# Patient Record
Sex: FEMALE | Race: WHITE | NOT HISPANIC OR LATINO | Employment: OTHER | URBAN - METROPOLITAN AREA
[De-identification: names, ages, dates, MRNs, and addresses within clinical notes are randomized per-mention and may not be internally consistent; named-entity substitution may affect disease eponyms.]

---

## 2021-02-09 ENCOUNTER — PREP FOR SURGERY (OUTPATIENT)
Dept: OTHER | Facility: HOSPITAL | Age: 66
End: 2021-02-09

## 2021-02-09 ENCOUNTER — OFFICE VISIT (OUTPATIENT)
Dept: SURGERY | Facility: CLINIC | Age: 66
End: 2021-02-09

## 2021-02-09 VITALS
HEART RATE: 82 BPM | BODY MASS INDEX: 43.55 KG/M2 | DIASTOLIC BLOOD PRESSURE: 80 MMHG | TEMPERATURE: 97.1 F | OXYGEN SATURATION: 95 % | HEIGHT: 66 IN | SYSTOLIC BLOOD PRESSURE: 124 MMHG | WEIGHT: 271 LBS

## 2021-02-09 DIAGNOSIS — K21.9 GASTROESOPHAGEAL REFLUX DISEASE, UNSPECIFIED WHETHER ESOPHAGITIS PRESENT: Primary | ICD-10-CM

## 2021-02-09 DIAGNOSIS — K21.9 GERD WITHOUT ESOPHAGITIS: Primary | ICD-10-CM

## 2021-02-09 PROBLEM — J44.9 COPD (CHRONIC OBSTRUCTIVE PULMONARY DISEASE): Status: ACTIVE | Noted: 2020-10-29

## 2021-02-09 PROBLEM — K90.9 MALABSORPTION OF IRON: Status: ACTIVE | Noted: 2017-12-19

## 2021-02-09 PROBLEM — M79.7 FIBROMYALGIA: Status: ACTIVE | Noted: 2018-09-04

## 2021-02-09 PROCEDURE — 99203 OFFICE O/P NEW LOW 30 MIN: CPT | Performed by: SURGERY

## 2021-02-09 RX ORDER — SODIUM CHLORIDE 9 MG/ML
100 INJECTION, SOLUTION INTRAVENOUS CONTINUOUS
Status: CANCELLED | OUTPATIENT
Start: 2021-02-09

## 2021-02-09 RX ORDER — OMEPRAZOLE 40 MG/1
40 CAPSULE, DELAYED RELEASE ORAL NIGHTLY
COMMUNITY
Start: 2020-11-11

## 2021-02-09 RX ORDER — ALENDRONATE SODIUM 70 MG/1
70 TABLET ORAL
COMMUNITY

## 2021-02-09 RX ORDER — OLOPATADINE HYDROCHLORIDE 1 MG/ML
1 SOLUTION/ DROPS OPHTHALMIC 2 TIMES DAILY
COMMUNITY
Start: 2020-05-20 | End: 2021-05-20

## 2021-02-09 RX ORDER — KETOCONAZOLE 20 MG/G
1 CREAM TOPICAL DAILY
COMMUNITY
Start: 2020-11-18

## 2021-02-09 RX ORDER — BUPROPION HYDROCHLORIDE 150 MG/1
150 TABLET ORAL EVERY MORNING
COMMUNITY
Start: 2020-12-14

## 2021-02-09 RX ORDER — ESCITALOPRAM OXALATE 20 MG/1
20 TABLET ORAL DAILY
COMMUNITY

## 2021-02-09 RX ORDER — LEVOCETIRIZINE DIHYDROCHLORIDE 5 MG/1
5 TABLET, FILM COATED ORAL DAILY
COMMUNITY
Start: 2020-12-08

## 2021-02-09 RX ORDER — LEVOTHYROXINE SODIUM 0.2 MG/1
200 TABLET ORAL DAILY
COMMUNITY
Start: 2020-10-22

## 2021-02-09 RX ORDER — ATORVASTATIN CALCIUM 20 MG/1
20 TABLET, FILM COATED ORAL NIGHTLY
COMMUNITY
Start: 2020-12-08

## 2021-02-09 RX ORDER — TIOTROPIUM BROMIDE AND OLODATEROL 3.124; 2.736 UG/1; UG/1
2 SPRAY, METERED RESPIRATORY (INHALATION) DAILY
COMMUNITY
Start: 2020-12-01

## 2021-02-09 RX ORDER — GABAPENTIN 600 MG/1
600 TABLET ORAL 3 TIMES DAILY
COMMUNITY
Start: 2021-02-05

## 2021-02-09 RX ORDER — SOLIFENACIN SUCCINATE 10 MG/1
10 TABLET, FILM COATED ORAL DAILY
COMMUNITY
Start: 2020-12-08

## 2021-02-09 RX ORDER — ARIPIPRAZOLE 5 MG/1
5 TABLET ORAL DAILY
COMMUNITY
Start: 2020-12-08 | End: 2022-01-26

## 2021-02-09 NOTE — PROGRESS NOTES
Subjective   Luz Bruce is a 65 y.o. female.     History of present illness  Ms. Bruce is a pleasant 65-year-old female seen in the office today at her own request from the GERD help hotline for 20+ year history of reflux.  Is been on a number of the different medications for that and currently is on 40 mg of omeprazole daily.  She states that the medicine is not controlling her symptoms.  She still awakens at night with burning and the sensation of fluid and food coming up into her esophagus.  She has no weight loss without trying associated with this.  She has no fevers chills she does have nausea but no vomiting.  She states that the back of her throat hurts and she knows it is from the reflux.  She has never had upper GI evaluation either by x-rays or endoscopically.  She has had colonoscopy.  I have suggested that she undergo an EGD with dilatation to get more information.    No past medical history on file.    Past Surgical History:   Procedure Laterality Date   • BUNIONECTOMY     • FOOT SURGERY      5 nodes removed   • HYSTERECTOMY     • NEPHRECTOMY RADICAL Left 2016   • PLANTAR FASCIECTOMY         Outpatient Encounter Medications as of 2/9/2021   Medication Sig Dispense Refill   • alendronate (FOSAMAX) 70 MG tablet Take 70 mg by mouth Every 7 (Seven) Days.     • ARIPiprazole (ABILIFY) 5 MG tablet Take 5 mg by mouth Daily.     • atorvastatin (LIPITOR) 20 MG tablet      • buPROPion XL (WELLBUTRIN XL) 150 MG 24 hr tablet Take 150 mg by mouth Every Morning.     • escitalopram (LEXAPRO) 20 MG tablet Take 20 mg by mouth Daily.     • gabapentin (NEURONTIN) 600 MG tablet Take 600 mg by mouth 3 (Three) Times a Day.     • ketoconazole (NIZORAL) 2 % cream APPLY CREAM TOPICALLY TO AFFECTED AREA TWICE DAILY FOR 30 DAYS     • levocetirizine (XYZAL) 5 MG tablet      • levothyroxine (SYNTHROID, LEVOTHROID) 200 MCG tablet Take 200 mcg by mouth Daily.     • olopatadine (PATANOL) 0.1 % ophthalmic solution Inject 1 drop  into the eye.     • omeprazole (priLOSEC) 40 MG capsule Take 40 mg by mouth Daily.     • solifenacin (VESICARE) 10 MG tablet Take 10 mg by mouth Daily.     • Stiolto Respimat 2.5-2.5 MCG/ACT aerosol solution inhaler Inhale 2 puffs Daily.       No facility-administered encounter medications on file as of 2021.        Allergies   Allergen Reactions   • Amoxicillin Rash       Family History   Problem Relation Age of Onset   • Lung cancer Mother    • Hyperlipidemia Mother    • Cervical cancer Sister    • Lung cancer Sister        Social History     Socioeconomic History   • Marital status:      Spouse name: Not on file   • Number of children: Not on file   • Years of education: Not on file   • Highest education level: Not on file   Tobacco Use   • Smoking status: Former Smoker     Types: Cigarettes     Quit date: 2007     Years since quittin.0   • Smokeless tobacco: Never Used       The following portions of the patient's history were reviewed and updated as appropriate: allergies, current medications, past family history, past medical history, past social history, past surgical history and problem list.    Objective     Complete review of systems is done and unremarkable with exception the chief complaint.    Physical exam shows a pleasant obese 65-year-old female.  HEENT is negative.  Heart regular.  Lungs are clear.  Abdomen is obese nontender no palpable mass.  Extremities with equal range of motion in the upper and lower extremities.  She has symmetrical strength in usage.  Neuro shows no obvious focal deficit.    Impression: Long history of GE reflux disease    Recommendation: EGD with dilatation.  Need to rule out Landon's hiatal hernia etc.  Assessment/Plan   There are no diagnoses linked to this encounter.               Mirza Dash DO  2021  08:37 EST

## 2021-02-09 NOTE — H&P
Subjective   Luz Bruce is a 65 y.o. female.     History of present illness  Ms. Bruce is a pleasant 65-year-old female seen in the office today at her own request from the GERD help hotline for 20+ year history of reflux.  Is been on a number of the different medications for that and currently is on 40 mg of omeprazole daily.  She states that the medicine is not controlling her symptoms.  She still awakens at night with burning and the sensation of fluid and food coming up into her esophagus.  She has no weight loss without trying associated with this.  She has no fevers chills she does have nausea but no vomiting.  She states that the back of her throat hurts and she knows it is from the reflux.  She has never had upper GI evaluation either by x-rays or endoscopically.  She has had colonoscopy.  I have suggested that she undergo an EGD with dilatation to get more information.    No past medical history on file.    Past Surgical History:   Procedure Laterality Date   • BUNIONECTOMY     • FOOT SURGERY      5 nodes removed   • HYSTERECTOMY     • NEPHRECTOMY RADICAL Left 2016   • PLANTAR FASCIECTOMY         Outpatient Encounter Medications as of 2/9/2021   Medication Sig Dispense Refill   • alendronate (FOSAMAX) 70 MG tablet Take 70 mg by mouth Every 7 (Seven) Days.     • ARIPiprazole (ABILIFY) 5 MG tablet Take 5 mg by mouth Daily.     • atorvastatin (LIPITOR) 20 MG tablet      • buPROPion XL (WELLBUTRIN XL) 150 MG 24 hr tablet Take 150 mg by mouth Every Morning.     • escitalopram (LEXAPRO) 20 MG tablet Take 20 mg by mouth Daily.     • gabapentin (NEURONTIN) 600 MG tablet Take 600 mg by mouth 3 (Three) Times a Day.     • ketoconazole (NIZORAL) 2 % cream APPLY CREAM TOPICALLY TO AFFECTED AREA TWICE DAILY FOR 30 DAYS     • levocetirizine (XYZAL) 5 MG tablet      • levothyroxine (SYNTHROID, LEVOTHROID) 200 MCG tablet Take 200 mcg by mouth Daily.     • olopatadine (PATANOL) 0.1 % ophthalmic solution Inject 1 drop  into the eye.     • omeprazole (priLOSEC) 40 MG capsule Take 40 mg by mouth Daily.     • solifenacin (VESICARE) 10 MG tablet Take 10 mg by mouth Daily.     • Stiolto Respimat 2.5-2.5 MCG/ACT aerosol solution inhaler Inhale 2 puffs Daily.       No facility-administered encounter medications on file as of 2021.        Allergies   Allergen Reactions   • Amoxicillin Rash       Family History   Problem Relation Age of Onset   • Lung cancer Mother    • Hyperlipidemia Mother    • Cervical cancer Sister    • Lung cancer Sister        Social History     Socioeconomic History   • Marital status:      Spouse name: Not on file   • Number of children: Not on file   • Years of education: Not on file   • Highest education level: Not on file   Tobacco Use   • Smoking status: Former Smoker     Types: Cigarettes     Quit date: 2007     Years since quittin.0   • Smokeless tobacco: Never Used       The following portions of the patient's history were reviewed and updated as appropriate: allergies, current medications, past family history, past medical history, past social history, past surgical history and problem list.    Objective     Complete review of systems is done and unremarkable with exception the chief complaint.    Physical exam shows a pleasant obese 65-year-old female.  HEENT is negative.  Heart regular.  Lungs are clear.  Abdomen is obese nontender no palpable mass.  Extremities with equal range of motion in the upper and lower extremities.  She has symmetrical strength in usage.  Neuro shows no obvious focal deficit.    Impression: Long history of GE reflux disease    Recommendation: EGD with dilatation.  Need to rule out Landon's hiatal hernia etc.  Assessment/Plan   There are no diagnoses linked to this encounter.               Mirza Dash,   2021  08:40 EST

## 2021-03-19 ENCOUNTER — LAB (OUTPATIENT)
Dept: LAB | Facility: HOSPITAL | Age: 66
End: 2021-03-19

## 2021-03-19 DIAGNOSIS — K21.9 GERD WITHOUT ESOPHAGITIS: ICD-10-CM

## 2021-03-19 LAB
ALBUMIN SERPL-MCNC: 4.6 G/DL (ref 3.5–5.2)
ALBUMIN/GLOB SERPL: 1.8 G/DL
ALP SERPL-CCNC: 80 U/L (ref 39–117)
ALT SERPL W P-5'-P-CCNC: 22 U/L (ref 1–33)
ANION GAP SERPL CALCULATED.3IONS-SCNC: 11.8 MMOL/L (ref 5–15)
AST SERPL-CCNC: 16 U/L (ref 1–32)
BASOPHILS # BLD AUTO: 0.05 10*3/MM3 (ref 0–0.2)
BASOPHILS NFR BLD AUTO: 0.7 % (ref 0–1.5)
BILIRUB SERPL-MCNC: 0.8 MG/DL (ref 0–1.2)
BUN SERPL-MCNC: 12 MG/DL (ref 8–23)
BUN/CREAT SERPL: 11.3 (ref 7–25)
CALCIUM SPEC-SCNC: 9.3 MG/DL (ref 8.6–10.5)
CHLORIDE SERPL-SCNC: 105 MMOL/L (ref 98–107)
CO2 SERPL-SCNC: 27.2 MMOL/L (ref 22–29)
CREAT SERPL-MCNC: 1.06 MG/DL (ref 0.57–1)
DEPRECATED RDW RBC AUTO: 43.7 FL (ref 37–54)
EOSINOPHIL # BLD AUTO: 0.18 10*3/MM3 (ref 0–0.4)
EOSINOPHIL NFR BLD AUTO: 2.4 % (ref 0.3–6.2)
ERYTHROCYTE [DISTWIDTH] IN BLOOD BY AUTOMATED COUNT: 15.4 % (ref 12.3–15.4)
GFR SERPL CREATININE-BSD FRML MDRD: 52 ML/MIN/1.73
GLOBULIN UR ELPH-MCNC: 2.6 GM/DL
GLUCOSE SERPL-MCNC: 134 MG/DL (ref 65–99)
HCT VFR BLD AUTO: 41.3 % (ref 34–46.6)
HGB BLD-MCNC: 12.8 G/DL (ref 12–15.9)
IMM GRANULOCYTES # BLD AUTO: 0.05 10*3/MM3 (ref 0–0.05)
IMM GRANULOCYTES NFR BLD AUTO: 0.7 % (ref 0–0.5)
LYMPHOCYTES # BLD AUTO: 1.52 10*3/MM3 (ref 0.7–3.1)
LYMPHOCYTES NFR BLD AUTO: 20.4 % (ref 19.6–45.3)
MCH RBC QN AUTO: 24.5 PG (ref 26.6–33)
MCHC RBC AUTO-ENTMCNC: 31 G/DL (ref 31.5–35.7)
MCV RBC AUTO: 79.1 FL (ref 79–97)
MONOCYTES # BLD AUTO: 0.49 10*3/MM3 (ref 0.1–0.9)
MONOCYTES NFR BLD AUTO: 6.6 % (ref 5–12)
NEUTROPHILS NFR BLD AUTO: 5.16 10*3/MM3 (ref 1.7–7)
NEUTROPHILS NFR BLD AUTO: 69.2 % (ref 42.7–76)
NRBC BLD AUTO-RTO: 0 /100 WBC (ref 0–0.2)
PLATELET # BLD AUTO: 246 10*3/MM3 (ref 140–450)
PMV BLD AUTO: 10.1 FL (ref 6–12)
POTASSIUM SERPL-SCNC: 4.9 MMOL/L (ref 3.5–5.2)
PROT SERPL-MCNC: 7.2 G/DL (ref 6–8.5)
RBC # BLD AUTO: 5.22 10*6/MM3 (ref 3.77–5.28)
SARS-COV-2 ORF1AB RESP QL NAA+PROBE: NOT DETECTED
SODIUM SERPL-SCNC: 144 MMOL/L (ref 136–145)
WBC # BLD AUTO: 7.45 10*3/MM3 (ref 3.4–10.8)

## 2021-03-19 PROCEDURE — 36415 COLL VENOUS BLD VENIPUNCTURE: CPT

## 2021-03-19 PROCEDURE — 80053 COMPREHEN METABOLIC PANEL: CPT

## 2021-03-19 PROCEDURE — C9803 HOPD COVID-19 SPEC COLLECT: HCPCS

## 2021-03-19 PROCEDURE — 85025 COMPLETE CBC W/AUTO DIFF WBC: CPT

## 2021-03-19 PROCEDURE — U0004 COV-19 TEST NON-CDC HGH THRU: HCPCS

## 2021-03-22 ENCOUNTER — ANESTHESIA (OUTPATIENT)
Dept: GASTROENTEROLOGY | Facility: HOSPITAL | Age: 66
End: 2021-03-22

## 2021-03-22 ENCOUNTER — ANESTHESIA EVENT (OUTPATIENT)
Dept: GASTROENTEROLOGY | Facility: HOSPITAL | Age: 66
End: 2021-03-22

## 2021-03-22 ENCOUNTER — BULK ORDERING (OUTPATIENT)
Dept: CASE MANAGEMENT | Facility: OTHER | Age: 66
End: 2021-03-22

## 2021-03-22 ENCOUNTER — HOSPITAL ENCOUNTER (OUTPATIENT)
Facility: HOSPITAL | Age: 66
Setting detail: HOSPITAL OUTPATIENT SURGERY
Discharge: HOME | End: 2021-03-22
Attending: SURGERY | Admitting: SURGERY

## 2021-03-22 VITALS
BODY MASS INDEX: 45.18 KG/M2 | RESPIRATION RATE: 21 BRPM | WEIGHT: 271.17 LBS | SYSTOLIC BLOOD PRESSURE: 134 MMHG | HEIGHT: 65 IN | HEART RATE: 81 BPM | TEMPERATURE: 98.1 F | OXYGEN SATURATION: 92 % | DIASTOLIC BLOOD PRESSURE: 71 MMHG

## 2021-03-22 VITALS — DIASTOLIC BLOOD PRESSURE: 85 MMHG | OXYGEN SATURATION: 99 % | SYSTOLIC BLOOD PRESSURE: 114 MMHG | HEART RATE: 84 BPM

## 2021-03-22 DIAGNOSIS — K21.9 GERD WITHOUT ESOPHAGITIS: ICD-10-CM

## 2021-03-22 DIAGNOSIS — Z23 IMMUNIZATION DUE: ICD-10-CM

## 2021-03-22 PROCEDURE — 43450 DILATE ESOPHAGUS 1/MULT PASS: CPT | Performed by: SURGERY

## 2021-03-22 PROCEDURE — 88305 TISSUE EXAM BY PATHOLOGIST: CPT | Performed by: SURGERY

## 2021-03-22 PROCEDURE — 25010000002 FENTANYL CITRATE (PF) 100 MCG/2ML SOLUTION: Performed by: NURSE ANESTHETIST, CERTIFIED REGISTERED

## 2021-03-22 PROCEDURE — 43239 EGD BIOPSY SINGLE/MULTIPLE: CPT | Performed by: SURGERY

## 2021-03-22 PROCEDURE — 88342 IMHCHEM/IMCYTCHM 1ST ANTB: CPT | Performed by: SURGERY

## 2021-03-22 PROCEDURE — 25010000002 PROPOFOL 10 MG/ML EMULSION: Performed by: NURSE ANESTHETIST, CERTIFIED REGISTERED

## 2021-03-22 RX ORDER — LIDOCAINE HYDROCHLORIDE 10 MG/ML
INJECTION, SOLUTION EPIDURAL; INFILTRATION; INTRACAUDAL; PERINEURAL AS NEEDED
Status: DISCONTINUED | OUTPATIENT
Start: 2021-03-22 | End: 2021-03-22 | Stop reason: SURG

## 2021-03-22 RX ORDER — ACETAMINOPHEN 650 MG/1
650 SUPPOSITORY RECTAL ONCE AS NEEDED
Status: DISCONTINUED | OUTPATIENT
Start: 2021-03-22 | End: 2021-03-22 | Stop reason: HOSPADM

## 2021-03-22 RX ORDER — ACETAMINOPHEN 325 MG/1
650 TABLET ORAL ONCE AS NEEDED
Status: DISCONTINUED | OUTPATIENT
Start: 2021-03-22 | End: 2021-03-22 | Stop reason: HOSPADM

## 2021-03-22 RX ORDER — ONDANSETRON 2 MG/ML
4 INJECTION INTRAMUSCULAR; INTRAVENOUS ONCE AS NEEDED
Status: DISCONTINUED | OUTPATIENT
Start: 2021-03-22 | End: 2021-03-22 | Stop reason: HOSPADM

## 2021-03-22 RX ORDER — SODIUM CHLORIDE 9 MG/ML
100 INJECTION, SOLUTION INTRAVENOUS CONTINUOUS
Status: DISCONTINUED | OUTPATIENT
Start: 2021-03-22 | End: 2021-03-22 | Stop reason: HOSPADM

## 2021-03-22 RX ORDER — FENTANYL CITRATE 50 UG/ML
INJECTION, SOLUTION INTRAMUSCULAR; INTRAVENOUS AS NEEDED
Status: DISCONTINUED | OUTPATIENT
Start: 2021-03-22 | End: 2021-03-22 | Stop reason: SURG

## 2021-03-22 RX ORDER — SODIUM CHLORIDE, SODIUM LACTATE, POTASSIUM CHLORIDE, CALCIUM CHLORIDE 600; 310; 30; 20 MG/100ML; MG/100ML; MG/100ML; MG/100ML
INJECTION, SOLUTION INTRAVENOUS CONTINUOUS PRN
Status: DISCONTINUED | OUTPATIENT
Start: 2021-03-22 | End: 2021-03-22

## 2021-03-22 RX ORDER — SODIUM CHLORIDE, SODIUM LACTATE, POTASSIUM CHLORIDE, CALCIUM CHLORIDE 600; 310; 30; 20 MG/100ML; MG/100ML; MG/100ML; MG/100ML
INJECTION, SOLUTION INTRAVENOUS CONTINUOUS PRN
Status: DISCONTINUED | OUTPATIENT
Start: 2021-03-22 | End: 2021-03-22 | Stop reason: SURG

## 2021-03-22 RX ADMIN — FENTANYL CITRATE 50 MCG: 50 INJECTION, SOLUTION INTRAMUSCULAR; INTRAVENOUS at 07:35

## 2021-03-22 RX ADMIN — PROPOFOL 50 MCG/KG/MIN: 10 INJECTION, EMULSION INTRAVENOUS at 07:37

## 2021-03-22 RX ADMIN — SODIUM CHLORIDE 100 ML/HR: 9 INJECTION, SOLUTION INTRAVENOUS at 06:49

## 2021-03-22 RX ADMIN — LIDOCAINE HYDROCHLORIDE 50 MG: 10 INJECTION, SOLUTION EPIDURAL; INFILTRATION; INTRACAUDAL; PERINEURAL at 07:36

## 2021-03-22 RX ADMIN — SODIUM CHLORIDE, SODIUM LACTATE, POTASSIUM CHLORIDE, AND CALCIUM CHLORIDE: .6; .31; .03; .02 INJECTION, SOLUTION INTRAVENOUS at 07:29

## 2021-03-22 NOTE — ANESTHESIA PREPROCEDURE EVALUATION
Anesthesia Evaluation     Patient summary reviewed and Nursing notes reviewed   no history of anesthetic complications:  NPO Solid Status: > 8 hours  NPO Liquid Status: > 8 hours           Airway   Mallampati: II  TM distance: >3 FB  Neck ROM: full  No difficulty expected  Dental - normal exam     Pulmonary - normal exam   (+) a smoker Former, COPD,   Cardiovascular - normal exam    (+) hyperlipidemia,       Neuro/Psych  (+) psychiatric history Depression,     GI/Hepatic/Renal/Endo    (+) morbid obesity, GERD, PUD,  renal disease, thyroid problem hypothyroidism    Musculoskeletal (-) negative ROS    Abdominal   (+) obese,    Substance History - negative use     OB/GYN negative ob/gyn ROS         Other      history of cancer                    Anesthesia Plan    ASA 3     MAC     intravenous induction     Anesthetic plan, all risks, benefits, and alternatives have been provided, discussed and informed consent has been obtained with: patient.    Plan discussed with CRNA.

## 2021-03-22 NOTE — H&P
Subjective   Luz Bruce is a 65 y.o. female.     History of present illness  Luz is a 65-year-old seen in the office at her own request from the GERD help hotline.  She has a 20+ year history of GE reflux disease.  She has been on a number of different medications for that currently is on 40 mg of omeprazole daily.  She states medicine is not controlling her symptoms.  She still awakens at night with burning and the sensation of fluid and food coming into her esophagus.  She has no fevers or chills she does have nausea but no vomiting.  States that the back of her throat hurts and she knows it is from the reflux.  She has never had upper GI eval or endoscopy.  She has had a colonoscopy have suggested she undergo an EGD with dilatation to get more information about potential surgery options.  For that reason she presents.    Past Medical History:   Diagnosis Date   • Allergies    • Anxiety and depression    • Blurred vision    • Cancer (CMS/HCC)     left kidney   • COPD (chronic obstructive pulmonary disease) (CMS/HCC)     mild   • GERD (gastroesophageal reflux disease)    • Hyperlipidemia    • Hypothyroid    • Neuropathy    • OAB (overactive bladder)    • Osteoporosis    • Rash     face       Past Surgical History:   Procedure Laterality Date   • BUNIONECTOMY     • FOOT SURGERY      5 nodes removed   • HYSTERECTOMY     • NEPHRECTOMY RADICAL Left 2016    cancer   • PLANTAR FASCIECTOMY         [unfilled]    Allergies   Allergen Reactions   • Amoxicillin Rash       Family History   Problem Relation Age of Onset   • Lung cancer Mother    • Hyperlipidemia Mother    • Cervical cancer Sister    • Lung cancer Sister        Social History     Socioeconomic History   • Marital status:      Spouse name: Not on file   • Number of children: Not on file   • Years of education: Not on file   • Highest education level: Not on file   Tobacco Use   • Smoking status: Former Smoker     Types: Cigarettes     Quit date:  2007     Years since quittin.1   • Smokeless tobacco: Never Used   Substance and Sexual Activity   • Alcohol use: Never   • Drug use: Never   • Sexual activity: Defer       The following portions of the patient's history were reviewed and updated as appropriate: allergies, current medications, past family history, past medical history, past social history, past surgical history and problem list.    Objective     Complete review of systems is done and unremarkable with exception of the chief complaint    Physical exam shows pleasant obese 65-year-old female.  HEENT is negative.  Heart regular.  Lungs are clear.  Abdomen obese nontender no palpable mass.  Extremities with equal range of motion and usage.  Neuro shows no obvious focal deficit.    Impression: Long history of GE reflux disease    Recommendation: EGD with dilatation  Assessment/Plan                  Mirza Dash DO  3/22/2021  07:13 EDT

## 2021-03-22 NOTE — ANESTHESIA POSTPROCEDURE EVALUATION
Patient: Luz Bruce    Procedure Summary     Date: 03/22/21 Room / Location: [facility] ENDOSCOPY 1 / [facility] ENDOSCOPY    Anesthesia Start: 0729 Anesthesia Stop: 0749    Procedure: ESOPHAGOGASTRODUODENOSCOPY with dilatation, bougie 48, 50, 52, 54, 56, 58, AND BIOPSY (N/A ) Diagnosis:       GERD without esophagitis      (GERD without esophagitis [K21.9])    Surgeons: Mirza Dash DO Provider: Derrick Mattson MD    Anesthesia Type: MAC ASA Status: 3          Anesthesia Type: MAC    Vitals  Vitals Value Taken Time   /71 03/22/21 0807   Temp     Pulse 78 03/22/21 0809   Resp     SpO2 92 % 03/22/21 0809   Vitals shown include unvalidated device data.        Post Anesthesia Care and Evaluation    Patient location during evaluation: PACU  Patient participation: complete - patient participated  Level of consciousness: awake  Pain scale: See nurse's notes for pain score.  Pain management: adequate  Airway patency: patent  Anesthetic complications: No anesthetic complications  PONV Status: none  Cardiovascular status: acceptable  Respiratory status: acceptable  Hydration status: acceptable    Comments: Patient seen and examined postoperatively; vital signs stable; SpO2 greater than or equal to 90%; cardiopulmonary status stable; nausea/vomiting adequately controlled; pain adequately controlled; no apparent anesthesia complications; patient discharged from anesthesia care when discharge criteria were met

## 2021-03-22 NOTE — OP NOTE
ESOPHAGOGASTRODUODENOSCOPY  Procedure Report    Patient Name:  Luz Bruce  YOB: 1955    Date of Surgery:  3/22/2021     Indications: GERD    Pre-op Diagnosis:   GERD without esophagitis [K21.9]       Post-Op Diagnosis Codes:     * GERD without esophagitis [K21.9], antral gastritis,  Procedure/CPT® Codes:      Procedure(s):  ESOPHAGOGASTRODUODENOSCOPY with dilatation, bougie 48, 50, 52, 54, 56, 58    Staff:  Surgeon(s):  Mirza Dash DO         Anesthesia: Monitored Anesthesia Care    Anesthetist: Dr. Inman    Estimated Blood Loss: none    Implants:    Nothing was implanted during the procedure    Specimen:          Specimens     ID Source Type Tests Collected By Collected At Frozen?    A Gastric, Antrum Tissue · TISSUE PATHOLOGY EXAM   Mirza Dash DO 3/22/21 0734 No    Description: antrum Biopsy    This specimen was not marked as sent.              Findings: Acute antral gastritis, no evidence of hiatal hernia, no Landon's    Complications: None    Description of Procedure: Ms. Bruce is a 65-year-old female who seen for severe reflux disease for 20+ years.  She has been on a number of different proton pump inhibitors and currently takes omeprazole 40 mg daily.  She cannot lie flat without regurgitation.  She has never undergone an upper endoscopic evaluation.  We recommended that she undergo an EGD with dilatation to see if she is a candidate for transoral fundoplication.  We discussed the procedure and risks in detail.    Patient was taken the operating room placed in the supine position.  IV sedation was done by nurse anesthesia and by Dr. Francois.  Timeout done identity verified.  The Olympus upper pan video scope was passed by the cricopharyngeus into the esophagus stomach and duodenum.  Duodenum was normal.  Scope was withdrawn back into the stomach stomach is inspected she has multiple inflammatory polyps consistent with long-term PPI use.  The antrum shows focal areas of  gastritis and biopsies taken there to check for helical back to her.  Scope was retroflexed upon itself viewing the EG junction.  There is no evidence of significant hiatal hernia.  Scope was then withdrawn back to the distal esophagus and the Z-line noted.  It is irregular consistent with reflux but no Landon's.  The remainder the esophageal mucosa is normal on the way out.  Z-line is located at 39 cm from the incisors.  We then sequentially dilated her esophagus from 48-58 Armenian using Taylor type dilators.  She tolerated the procedure well and was transferred back to the recovery area in satisfactory condition.        Mirza Dash,      Date: 3/22/2021  Time: 07:41 EDT

## 2021-03-22 NOTE — DISCHARGE INSTRUCTIONS
A responsible adult should stay with you and you should rest quietly for the rest of the day.    Do not drink alcohol, drive, operate any heavy machinery or power tools or make any legal/important decisions for the next 24 hours.     Progress your diet as tolerated.  If you begin to experience severe pain, increased shortness of breath, racing heartbeat or a fever above 101 F, seek immediate medical attention.     Follow up with MD as instructed. Call office for results in 3 to 5 days if needed.    DR STEPHENSON [phone]    Findings: Acute antral gastritis, no evidence of hiatal hernia, no Landon's

## 2021-03-23 LAB
LAB AP CASE REPORT: NORMAL
PATH REPORT.FINAL DX SPEC: NORMAL
PATH REPORT.GROSS SPEC: NORMAL

## 2021-04-12 ENCOUNTER — PREP FOR SURGERY (OUTPATIENT)
Dept: OTHER | Facility: HOSPITAL | Age: 66
End: 2021-04-12

## 2021-04-12 ENCOUNTER — OFFICE VISIT (OUTPATIENT)
Dept: SURGERY | Facility: CLINIC | Age: 66
End: 2021-04-12

## 2021-04-12 VITALS
WEIGHT: 270 LBS | SYSTOLIC BLOOD PRESSURE: 169 MMHG | DIASTOLIC BLOOD PRESSURE: 100 MMHG | HEIGHT: 65 IN | BODY MASS INDEX: 44.98 KG/M2 | OXYGEN SATURATION: 95 % | HEART RATE: 86 BPM | TEMPERATURE: 96.9 F

## 2021-04-12 DIAGNOSIS — K21.9 GERD WITHOUT ESOPHAGITIS: Primary | ICD-10-CM

## 2021-04-12 DIAGNOSIS — K21.9 GASTROESOPHAGEAL REFLUX DISEASE WITHOUT ESOPHAGITIS: Primary | ICD-10-CM

## 2021-04-12 PROCEDURE — 99213 OFFICE O/P EST LOW 20 MIN: CPT | Performed by: SURGERY

## 2021-04-12 RX ORDER — CLINDAMYCIN PHOSPHATE 900 MG/50ML
900 INJECTION, SOLUTION INTRAVENOUS ONCE
Status: CANCELLED | OUTPATIENT
Start: 2021-04-12 | End: 2021-04-12

## 2021-04-12 RX ORDER — SODIUM CHLORIDE 9 MG/ML
100 INJECTION, SOLUTION INTRAVENOUS CONTINUOUS
Status: CANCELLED | OUTPATIENT
Start: 2021-04-12

## 2021-04-12 NOTE — PROGRESS NOTES
Subjective   Luz Bruce is a 65 y.o. female.     History of present illness  Teo is seen in the office today to discuss the results of her EGD with biopsy recently.  She was found to have some antral gastritis but no helical factor.  She had no significant hiatal hernia but does reflux.  Consequently she would be a good candidate for straight transoral fundoplication.    In the office today we have discussed that and answered all her questions.  We will get her scheduled for straight TIF.    Past Medical History:   Diagnosis Date   • Allergies    • Anxiety and depression    • Blurred vision    • Cancer (CMS/HCC)     left kidney   • COPD (chronic obstructive pulmonary disease) (CMS/HCC)     mild   • GERD (gastroesophageal reflux disease)    • Hyperlipidemia    • Hypothyroid    • Neuropathy    • OAB (overactive bladder)    • Osteoporosis    • Rash     face       Past Surgical History:   Procedure Laterality Date   • BUNIONECTOMY     • ENDOSCOPY N/A 3/22/2021    Procedure: ESOPHAGOGASTRODUODENOSCOPY with dilatation, bougie 48, 50, 52, 54, 56, 58, AND BIOPSY;  Surgeon: Mirza Dash DO;  Location: [facility] ENDOSCOPY;  Service: General;  Laterality: N/A;  gastritis, GERD, dysphagia   • FOOT SURGERY      5 nodes removed   • HYSTERECTOMY     • NEPHRECTOMY RADICAL Left 2016    cancer   • PLANTAR FASCIECTOMY         Outpatient Encounter Medications as of 4/12/2021   Medication Sig Dispense Refill   • alendronate (FOSAMAX) 70 MG tablet Take 70 mg by mouth Every 7 (Seven) Days. sunday     • ARIPiprazole (ABILIFY) 5 MG tablet Take 5 mg by mouth Daily. Take preop     • atorvastatin (LIPITOR) 20 MG tablet Take 20 mg by mouth Every Night.     • buPROPion XL (WELLBUTRIN XL) 150 MG 24 hr tablet Take 150 mg by mouth Every Morning. Take preop     • escitalopram (LEXAPRO) 20 MG tablet Take 20 mg by mouth Daily. Take preop     • gabapentin (NEURONTIN) 600 MG tablet Take 600 mg by mouth 3 (Three) Times a Day. Take preop     •  ketoconazole (NIZORAL) 2 % cream Apply 1 application topically to the appropriate area as directed Daily. Use post procedure     • levocetirizine (XYZAL) 5 MG tablet Take 5 mg by mouth Daily. May take preop     • levothyroxine (SYNTHROID, LEVOTHROID) 200 MCG tablet Take 200 mcg by mouth Daily. Take preop     • olopatadine (PATANOL) 0.1 % ophthalmic solution Administer 1 drop to both eyes 2 (Two) Times a Day. Use preop     • omeprazole (priLOSEC) 40 MG capsule Take 40 mg by mouth Every Night.     • solifenacin (VESICARE) 10 MG tablet Take 10 mg by mouth Daily. Take preop     • Stiolto Respimat 2.5-2.5 MCG/ACT aerosol solution inhaler Inhale 2 puffs Daily. Take preop       No facility-administered encounter medications on file as of 2021.       Allergies   Allergen Reactions   • Amoxicillin Rash       Family History   Problem Relation Age of Onset   • Lung cancer Mother    • Hyperlipidemia Mother    • Cervical cancer Sister    • Lung cancer Sister        Social History     Socioeconomic History   • Marital status:      Spouse name: Not on file   • Number of children: Not on file   • Years of education: Not on file   • Highest education level: Not on file   Tobacco Use   • Smoking status: Former Smoker     Types: Cigarettes     Quit date: 2007     Years since quittin.1   • Smokeless tobacco: Never Used   Substance and Sexual Activity   • Alcohol use: Never   • Drug use: Never   • Sexual activity: Defer       The following portions of the patient's history were reviewed and updated as appropriate: allergies, current medications, past family history, past medical history, past social history, past surgical history and problem list.    Objective       Assessment/Plan   There are no diagnoses linked to this encounter.    Impression: Transoral fundoplication, no hiatal hernia component    Plan: We will have Erin give her a call and get that scheduled.           Mirza Dash, DO  2021  16:44  EDT

## 2021-04-12 NOTE — H&P
Subjective   Luz Bruce is a 65 y.o. female.     History of present illness  Teo is seen in the office today to discuss the results of her EGD with biopsy recently.  She was found to have some antral gastritis but no helical factor.  She had no significant hiatal hernia but does reflux.  Consequently she would be a good candidate for straight transoral fundoplication.    In the office today we have discussed that and answered all her questions.  We will get her scheduled for straight TIF.    Past Medical History:   Diagnosis Date   • Allergies    • Anxiety and depression    • Blurred vision    • Cancer (CMS/HCC)     left kidney   • COPD (chronic obstructive pulmonary disease) (CMS/HCC)     mild   • GERD (gastroesophageal reflux disease)    • Hyperlipidemia    • Hypothyroid    • Neuropathy    • OAB (overactive bladder)    • Osteoporosis    • Rash     face       Past Surgical History:   Procedure Laterality Date   • BUNIONECTOMY     • ENDOSCOPY N/A 3/22/2021    Procedure: ESOPHAGOGASTRODUODENOSCOPY with dilatation, bougie 48, 50, 52, 54, 56, 58, AND BIOPSY;  Surgeon: Mirza Dash DO;  Location: [facility] ENDOSCOPY;  Service: General;  Laterality: N/A;  gastritis, GERD, dysphagia   • FOOT SURGERY      5 nodes removed   • HYSTERECTOMY     • NEPHRECTOMY RADICAL Left 2016    cancer   • PLANTAR FASCIECTOMY         Outpatient Encounter Medications as of 4/12/2021   Medication Sig Dispense Refill   • alendronate (FOSAMAX) 70 MG tablet Take 70 mg by mouth Every 7 (Seven) Days. sunday     • ARIPiprazole (ABILIFY) 5 MG tablet Take 5 mg by mouth Daily. Take preop     • atorvastatin (LIPITOR) 20 MG tablet Take 20 mg by mouth Every Night.     • buPROPion XL (WELLBUTRIN XL) 150 MG 24 hr tablet Take 150 mg by mouth Every Morning. Take preop     • escitalopram (LEXAPRO) 20 MG tablet Take 20 mg by mouth Daily. Take preop     • gabapentin (NEURONTIN) 600 MG tablet Take 600 mg by mouth 3 (Three) Times a Day. Take preop     •  ketoconazole (NIZORAL) 2 % cream Apply 1 application topically to the appropriate area as directed Daily. Use post procedure     • levocetirizine (XYZAL) 5 MG tablet Take 5 mg by mouth Daily. May take preop     • levothyroxine (SYNTHROID, LEVOTHROID) 200 MCG tablet Take 200 mcg by mouth Daily. Take preop     • olopatadine (PATANOL) 0.1 % ophthalmic solution Administer 1 drop to both eyes 2 (Two) Times a Day. Use preop     • omeprazole (priLOSEC) 40 MG capsule Take 40 mg by mouth Every Night.     • solifenacin (VESICARE) 10 MG tablet Take 10 mg by mouth Daily. Take preop     • Stiolto Respimat 2.5-2.5 MCG/ACT aerosol solution inhaler Inhale 2 puffs Daily. Take preop       No facility-administered encounter medications on file as of 2021.       Allergies   Allergen Reactions   • Amoxicillin Rash       Family History   Problem Relation Age of Onset   • Lung cancer Mother    • Hyperlipidemia Mother    • Cervical cancer Sister    • Lung cancer Sister        Social History     Socioeconomic History   • Marital status:      Spouse name: Not on file   • Number of children: Not on file   • Years of education: Not on file   • Highest education level: Not on file   Tobacco Use   • Smoking status: Former Smoker     Types: Cigarettes     Quit date: 2007     Years since quittin.1   • Smokeless tobacco: Never Used   Substance and Sexual Activity   • Alcohol use: Never   • Drug use: Never   • Sexual activity: Defer       The following portions of the patient's history were reviewed and updated as appropriate: allergies, current medications, past family history, past medical history, past social history, past surgical history and problem list.    Objective       Assessment/Plan   There are no diagnoses linked to this encounter.    Impression: Transoral fundoplication, no hiatal hernia component    Plan: We will have Erin give her a call and get that scheduled.           Mirza Dash, DO  2021  16:46  EDT

## 2021-04-23 ENCOUNTER — HOSPITAL ENCOUNTER (OUTPATIENT)
Dept: CARDIOLOGY | Facility: HOSPITAL | Age: 66
Discharge: HOME | End: 2021-04-23

## 2021-04-23 ENCOUNTER — LAB (OUTPATIENT)
Dept: LAB | Facility: HOSPITAL | Age: 66
End: 2021-04-23

## 2021-04-23 ENCOUNTER — HOSPITAL ENCOUNTER (OUTPATIENT)
Dept: GENERAL RADIOLOGY | Facility: HOSPITAL | Age: 66
Discharge: HOME | End: 2021-04-23

## 2021-04-23 DIAGNOSIS — K21.9 GERD WITHOUT ESOPHAGITIS: ICD-10-CM

## 2021-04-23 LAB
ABO GROUP BLD: NORMAL
ALBUMIN SERPL-MCNC: 4.2 G/DL (ref 3.5–5.2)
ALBUMIN/GLOB SERPL: 1.6 G/DL
ALP SERPL-CCNC: 83 U/L (ref 39–117)
ALT SERPL W P-5'-P-CCNC: 20 U/L (ref 1–33)
ANION GAP SERPL CALCULATED.3IONS-SCNC: 13 MMOL/L (ref 5–15)
ANTI-C: NORMAL
ANTI-D: NORMAL
APTT PPP: 22.2 SECONDS (ref 24–31)
AST SERPL-CCNC: 18 U/L (ref 1–32)
BASOPHILS # BLD MANUAL: 0.07 10*3/MM3 (ref 0–0.2)
BASOPHILS NFR BLD AUTO: 1 % (ref 0–1.5)
BILIRUB SERPL-MCNC: 0.7 MG/DL (ref 0–1.2)
BLD GP AB SCN SERPL QL: POSITIVE
BUN SERPL-MCNC: 14 MG/DL (ref 8–23)
BUN/CREAT SERPL: 14.7 (ref 7–25)
C AG RBC QL: NEGATIVE
CALCIUM SPEC-SCNC: 9.9 MG/DL (ref 8.6–10.5)
CHLORIDE SERPL-SCNC: 104 MMOL/L (ref 98–107)
CO2 SERPL-SCNC: 26 MMOL/L (ref 22–29)
CREAT SERPL-MCNC: 0.95 MG/DL (ref 0.57–1)
DEPRECATED RDW RBC AUTO: 45.1 FL (ref 37–54)
EOSINOPHIL # BLD MANUAL: 0.2 10*3/MM3 (ref 0–0.4)
EOSINOPHIL NFR BLD MANUAL: 3 % (ref 0.3–6.2)
ERYTHROCYTE [DISTWIDTH] IN BLOOD BY AUTOMATED COUNT: 15.9 % (ref 12.3–15.4)
GFR SERPL CREATININE-BSD FRML MDRD: 59 ML/MIN/1.73
GLOBULIN UR ELPH-MCNC: 2.6 GM/DL
GLUCOSE SERPL-MCNC: 91 MG/DL (ref 65–99)
HCT VFR BLD AUTO: 40.8 % (ref 34–46.6)
HGB BLD-MCNC: 12.7 G/DL (ref 12–15.9)
INR PPP: 0.95 (ref 0.93–1.1)
LYMPHOCYTES # BLD MANUAL: 1.71 10*3/MM3 (ref 0.7–3.1)
LYMPHOCYTES NFR BLD MANUAL: 25.3 % (ref 19.6–45.3)
LYMPHOCYTES NFR BLD MANUAL: 9.1 % (ref 5–12)
MCH RBC QN AUTO: 24.6 PG (ref 26.6–33)
MCHC RBC AUTO-ENTMCNC: 31.1 G/DL (ref 31.5–35.7)
MCV RBC AUTO: 79.1 FL (ref 79–97)
MONOCYTES # BLD AUTO: 0.62 10*3/MM3 (ref 0.1–0.9)
NEUTROPHILS # BLD AUTO: 4.16 10*3/MM3 (ref 1.7–7)
NEUTROPHILS NFR BLD MANUAL: 61.6 % (ref 42.7–76)
PLAT MORPH BLD: NORMAL
PLATELET # BLD AUTO: 253 10*3/MM3 (ref 140–450)
PMV BLD AUTO: 10.9 FL (ref 6–12)
POTASSIUM SERPL-SCNC: 4.4 MMOL/L (ref 3.5–5.2)
PROT SERPL-MCNC: 6.8 G/DL (ref 6–8.5)
PROTHROMBIN TIME: 10.5 SECONDS (ref 9.6–11.7)
QT INTERVAL: 329 MS
RBC # BLD AUTO: 5.16 10*6/MM3 (ref 3.77–5.28)
RBC MORPH BLD: NORMAL
RH BLD: NEGATIVE
SODIUM SERPL-SCNC: 143 MMOL/L (ref 136–145)
T&S EXPIRATION DATE: NORMAL
WBC # BLD AUTO: 6.76 10*3/MM3 (ref 3.4–10.8)
WBC MORPH BLD: NORMAL

## 2021-04-23 PROCEDURE — 85007 BL SMEAR W/DIFF WBC COUNT: CPT

## 2021-04-23 PROCEDURE — 86870 RBC ANTIBODY IDENTIFICATION: CPT

## 2021-04-23 PROCEDURE — 85610 PROTHROMBIN TIME: CPT

## 2021-04-23 PROCEDURE — 80053 COMPREHEN METABOLIC PANEL: CPT

## 2021-04-23 PROCEDURE — 93005 ELECTROCARDIOGRAM TRACING: CPT | Performed by: SURGERY

## 2021-04-23 PROCEDURE — 71046 X-RAY EXAM CHEST 2 VIEWS: CPT

## 2021-04-23 PROCEDURE — 86922 COMPATIBILITY TEST ANTIGLOB: CPT

## 2021-04-23 PROCEDURE — 85730 THROMBOPLASTIN TIME PARTIAL: CPT

## 2021-04-23 PROCEDURE — 86901 BLOOD TYPING SEROLOGIC RH(D): CPT

## 2021-04-23 PROCEDURE — 86905 BLOOD TYPING RBC ANTIGENS: CPT

## 2021-04-23 PROCEDURE — 86850 RBC ANTIBODY SCREEN: CPT

## 2021-04-23 PROCEDURE — 86900 BLOOD TYPING SEROLOGIC ABO: CPT

## 2021-04-23 PROCEDURE — 36415 COLL VENOUS BLD VENIPUNCTURE: CPT

## 2021-04-23 PROCEDURE — 93010 ELECTROCARDIOGRAM REPORT: CPT | Performed by: INTERNAL MEDICINE

## 2021-04-23 PROCEDURE — 85025 COMPLETE CBC W/AUTO DIFF WBC: CPT

## 2021-04-26 ENCOUNTER — LAB (OUTPATIENT)
Dept: LAB | Facility: HOSPITAL | Age: 66
End: 2021-04-26

## 2021-04-26 LAB — SARS-COV-2 ORF1AB RESP QL NAA+PROBE: NOT DETECTED

## 2021-04-26 PROCEDURE — U0004 COV-19 TEST NON-CDC HGH THRU: HCPCS

## 2021-04-26 PROCEDURE — C9803 HOPD COVID-19 SPEC COLLECT: HCPCS

## 2021-04-28 ENCOUNTER — ANESTHESIA (OUTPATIENT)
Dept: PERIOP | Facility: HOSPITAL | Age: 66
End: 2021-04-28

## 2021-04-28 ENCOUNTER — ANESTHESIA EVENT (OUTPATIENT)
Dept: PERIOP | Facility: HOSPITAL | Age: 66
End: 2021-04-28

## 2021-04-28 ENCOUNTER — HOSPITAL ENCOUNTER (OUTPATIENT)
Facility: HOSPITAL | Age: 66
Setting detail: HOSPITAL OUTPATIENT SURGERY
Discharge: HOME | End: 2021-04-28
Attending: SURGERY | Admitting: SURGERY

## 2021-04-28 VITALS
HEIGHT: 65 IN | BODY MASS INDEX: 45.18 KG/M2 | TEMPERATURE: 97.5 F | DIASTOLIC BLOOD PRESSURE: 60 MMHG | RESPIRATION RATE: 20 BRPM | OXYGEN SATURATION: 96 % | WEIGHT: 271.17 LBS | HEART RATE: 76 BPM | SYSTOLIC BLOOD PRESSURE: 101 MMHG

## 2021-04-28 DIAGNOSIS — K21.9 GASTROESOPHAGEAL REFLUX DISEASE, UNSPECIFIED WHETHER ESOPHAGITIS PRESENT: Primary | ICD-10-CM

## 2021-04-28 DIAGNOSIS — K21.9 GERD WITHOUT ESOPHAGITIS: ICD-10-CM

## 2021-04-28 PROCEDURE — C1889 IMPLANT/INSERT DEVICE, NOC: HCPCS | Performed by: SURGERY

## 2021-04-28 PROCEDURE — 25010000002 NEOSTIGMINE 5 MG/5ML SOLUTION: Performed by: STUDENT IN AN ORGANIZED HEALTH CARE EDUCATION/TRAINING PROGRAM

## 2021-04-28 PROCEDURE — 25010000002 KETOROLAC TROMETHAMINE PER 15 MG: Performed by: STUDENT IN AN ORGANIZED HEALTH CARE EDUCATION/TRAINING PROGRAM

## 2021-04-28 PROCEDURE — 25010000002 DEXAMETHASONE PER 1 MG: Performed by: STUDENT IN AN ORGANIZED HEALTH CARE EDUCATION/TRAINING PROGRAM

## 2021-04-28 PROCEDURE — 25010000002 MIDAZOLAM PER 1 MG: Performed by: STUDENT IN AN ORGANIZED HEALTH CARE EDUCATION/TRAINING PROGRAM

## 2021-04-28 PROCEDURE — 25010000002 ONDANSETRON PER 1 MG: Performed by: STUDENT IN AN ORGANIZED HEALTH CARE EDUCATION/TRAINING PROGRAM

## 2021-04-28 PROCEDURE — 25010000002 HYDROMORPHONE PER 4 MG: Performed by: STUDENT IN AN ORGANIZED HEALTH CARE EDUCATION/TRAINING PROGRAM

## 2021-04-28 PROCEDURE — 25010000002 PROPOFOL 10 MG/ML EMULSION: Performed by: STUDENT IN AN ORGANIZED HEALTH CARE EDUCATION/TRAINING PROGRAM

## 2021-04-28 PROCEDURE — 43210 EGD ESOPHAGOGASTRC FNDOPLSTY: CPT | Performed by: SURGERY

## 2021-04-28 PROCEDURE — 25010000002 FENTANYL CITRATE (PF) 100 MCG/2ML SOLUTION: Performed by: STUDENT IN AN ORGANIZED HEALTH CARE EDUCATION/TRAINING PROGRAM

## 2021-04-28 DEVICE — CARTRIDGE, 20 FASTENERS, 0.010" SLOT, 7.5MM WEB
Type: IMPLANTABLE DEVICE | Site: ESOPHAGUS | Status: FUNCTIONAL
Brand: 7.5MM CARTRIDGE

## 2021-04-28 DEVICE — ESOPHYX Z+ FASTENER DELIVERY DEVICE WITH SEROSAFUSE FASTENERS AND ACCESSORIES
Type: IMPLANTABLE DEVICE | Site: ESOPHAGUS | Status: FUNCTIONAL
Brand: ESOPHYX Z+

## 2021-04-28 RX ORDER — KETAMINE HYDROCHLORIDE 10 MG/ML
INJECTION INTRAMUSCULAR; INTRAVENOUS AS NEEDED
Status: DISCONTINUED | OUTPATIENT
Start: 2021-04-28 | End: 2021-04-28 | Stop reason: SURG

## 2021-04-28 RX ORDER — HYDROMORPHONE HCL 110MG/55ML
0.5 PATIENT CONTROLLED ANALGESIA SYRINGE INTRAVENOUS
Status: DISCONTINUED | OUTPATIENT
Start: 2021-04-28 | End: 2021-04-28 | Stop reason: HOSPADM

## 2021-04-28 RX ORDER — FENTANYL CITRATE 50 UG/ML
INJECTION, SOLUTION INTRAMUSCULAR; INTRAVENOUS AS NEEDED
Status: DISCONTINUED | OUTPATIENT
Start: 2021-04-28 | End: 2021-04-28 | Stop reason: SURG

## 2021-04-28 RX ORDER — MIDAZOLAM HYDROCHLORIDE 1 MG/ML
INJECTION INTRAMUSCULAR; INTRAVENOUS AS NEEDED
Status: DISCONTINUED | OUTPATIENT
Start: 2021-04-28 | End: 2021-04-28 | Stop reason: SURG

## 2021-04-28 RX ORDER — PHENYLEPHRINE HCL IN 0.9% NACL 1 MG/10 ML
SYRINGE (ML) INTRAVENOUS AS NEEDED
Status: DISCONTINUED | OUTPATIENT
Start: 2021-04-28 | End: 2021-04-28 | Stop reason: SURG

## 2021-04-28 RX ORDER — DEXAMETHASONE SODIUM PHOSPHATE 4 MG/ML
INJECTION, SOLUTION INTRA-ARTICULAR; INTRALESIONAL; INTRAMUSCULAR; INTRAVENOUS; SOFT TISSUE AS NEEDED
Status: DISCONTINUED | OUTPATIENT
Start: 2021-04-28 | End: 2021-04-28 | Stop reason: SURG

## 2021-04-28 RX ORDER — SODIUM CHLORIDE, SODIUM LACTATE, POTASSIUM CHLORIDE, CALCIUM CHLORIDE 600; 310; 30; 20 MG/100ML; MG/100ML; MG/100ML; MG/100ML
INJECTION, SOLUTION INTRAVENOUS CONTINUOUS PRN
Status: DISCONTINUED | OUTPATIENT
Start: 2021-04-28 | End: 2021-04-28 | Stop reason: SURG

## 2021-04-28 RX ORDER — SODIUM CHLORIDE 9 MG/ML
100 INJECTION, SOLUTION INTRAVENOUS CONTINUOUS
Status: DISCONTINUED | OUTPATIENT
Start: 2021-04-28 | End: 2021-04-28 | Stop reason: HOSPADM

## 2021-04-28 RX ORDER — PROPOFOL 10 MG/ML
VIAL (ML) INTRAVENOUS AS NEEDED
Status: DISCONTINUED | OUTPATIENT
Start: 2021-04-28 | End: 2021-04-28 | Stop reason: SURG

## 2021-04-28 RX ORDER — FENTANYL CITRATE 50 UG/ML
50 INJECTION, SOLUTION INTRAMUSCULAR; INTRAVENOUS
Status: DISCONTINUED | OUTPATIENT
Start: 2021-04-28 | End: 2021-04-28 | Stop reason: HOSPADM

## 2021-04-28 RX ORDER — ONDANSETRON 2 MG/ML
4 INJECTION INTRAMUSCULAR; INTRAVENOUS ONCE AS NEEDED
Status: DISCONTINUED | OUTPATIENT
Start: 2021-04-28 | End: 2021-04-28 | Stop reason: HOSPADM

## 2021-04-28 RX ORDER — KETOROLAC TROMETHAMINE 30 MG/ML
INJECTION, SOLUTION INTRAMUSCULAR; INTRAVENOUS AS NEEDED
Status: DISCONTINUED | OUTPATIENT
Start: 2021-04-28 | End: 2021-04-28 | Stop reason: SURG

## 2021-04-28 RX ORDER — CLINDAMYCIN PHOSPHATE 900 MG/50ML
900 INJECTION, SOLUTION INTRAVENOUS ONCE
Status: COMPLETED | OUTPATIENT
Start: 2021-04-28 | End: 2021-04-28

## 2021-04-28 RX ORDER — ONDANSETRON 2 MG/ML
INJECTION INTRAMUSCULAR; INTRAVENOUS AS NEEDED
Status: DISCONTINUED | OUTPATIENT
Start: 2021-04-28 | End: 2021-04-28 | Stop reason: SURG

## 2021-04-28 RX ORDER — HYDROCODONE BITARTRATE AND ACETAMINOPHEN 5; 325 MG/1; MG/1
1 TABLET ORAL EVERY 6 HOURS PRN
Qty: 20 TABLET | Refills: 0 | Status: SHIPPED | OUTPATIENT
Start: 2021-04-28 | End: 2022-01-26

## 2021-04-28 RX ORDER — ROCURONIUM BROMIDE 10 MG/ML
INJECTION, SOLUTION INTRAVENOUS AS NEEDED
Status: DISCONTINUED | OUTPATIENT
Start: 2021-04-28 | End: 2021-04-28 | Stop reason: SURG

## 2021-04-28 RX ORDER — HYDROMORPHONE HCL 110MG/55ML
PATIENT CONTROLLED ANALGESIA SYRINGE INTRAVENOUS AS NEEDED
Status: DISCONTINUED | OUTPATIENT
Start: 2021-04-28 | End: 2021-04-28 | Stop reason: SURG

## 2021-04-28 RX ORDER — NEOSTIGMINE METHYLSULFATE 5 MG/5 ML
SYRINGE (ML) INTRAVENOUS AS NEEDED
Status: DISCONTINUED | OUTPATIENT
Start: 2021-04-28 | End: 2021-04-28 | Stop reason: SURG

## 2021-04-28 RX ORDER — GLYCOPYRROLATE 1 MG/5 ML
SYRINGE (ML) INTRAVENOUS AS NEEDED
Status: DISCONTINUED | OUTPATIENT
Start: 2021-04-28 | End: 2021-04-28 | Stop reason: SURG

## 2021-04-28 RX ADMIN — ONDANSETRON 4 MG: 2 INJECTION INTRAMUSCULAR; INTRAVENOUS at 09:19

## 2021-04-28 RX ADMIN — CLINDAMYCIN PHOSPHATE 900 MG: 900 INJECTION, SOLUTION INTRAVENOUS at 08:50

## 2021-04-28 RX ADMIN — SUGAMMADEX 200 MG: 100 INJECTION, SOLUTION INTRAVENOUS at 09:28

## 2021-04-28 RX ADMIN — PROPOFOL 80 MG: 10 INJECTION, EMULSION INTRAVENOUS at 09:14

## 2021-04-28 RX ADMIN — KETAMINE HYDROCHLORIDE 25 MG: 10 INJECTION, SOLUTION INTRAMUSCULAR; INTRAVENOUS at 08:40

## 2021-04-28 RX ADMIN — PROPOFOL 100 MCG/KG/MIN: 10 INJECTION, EMULSION INTRAVENOUS at 08:45

## 2021-04-28 RX ADMIN — PROPOFOL 120 MG: 10 INJECTION, EMULSION INTRAVENOUS at 08:40

## 2021-04-28 RX ADMIN — ROCURONIUM BROMIDE 50 MG: 10 INJECTION INTRAVENOUS at 08:40

## 2021-04-28 RX ADMIN — Medication 0.6 MG: at 09:19

## 2021-04-28 RX ADMIN — Medication 3.5 MG: at 09:19

## 2021-04-28 RX ADMIN — DEXAMETHASONE SODIUM PHOSPHATE 4 MG: 4 INJECTION, SOLUTION INTRAMUSCULAR; INTRAVENOUS at 08:40

## 2021-04-28 RX ADMIN — Medication 100 MCG: at 09:04

## 2021-04-28 RX ADMIN — Medication 100 MCG: at 08:59

## 2021-04-28 RX ADMIN — LIDOCAINE HYDROCHLORIDE: 20 SOLUTION ORAL; TOPICAL at 10:49

## 2021-04-28 RX ADMIN — HYDROMORPHONE HYDROCHLORIDE 0.5 MG: 2 INJECTION INTRAMUSCULAR; INTRAVENOUS; SUBCUTANEOUS at 08:51

## 2021-04-28 RX ADMIN — SODIUM CHLORIDE, SODIUM LACTATE, POTASSIUM CHLORIDE, AND CALCIUM CHLORIDE: .6; .31; .03; .02 INJECTION, SOLUTION INTRAVENOUS at 08:35

## 2021-04-28 RX ADMIN — MIDAZOLAM 2 MG: 1 INJECTION INTRAMUSCULAR; INTRAVENOUS at 08:37

## 2021-04-28 RX ADMIN — Medication 100 MCG: at 08:57

## 2021-04-28 RX ADMIN — KETOROLAC TROMETHAMINE 30 MG: 30 INJECTION, SOLUTION INTRAMUSCULAR at 09:19

## 2021-04-28 RX ADMIN — FENTANYL CITRATE 100 MCG: 50 INJECTION, SOLUTION INTRAMUSCULAR; INTRAVENOUS at 08:37

## 2021-04-28 NOTE — ANESTHESIA PROCEDURE NOTES
Airway  Urgency: elective    Date/Time: 4/28/2021 8:43 AM  Airway not difficult    General Information and Staff    Patient location during procedure: OR  Anesthesiologist: Johan Gomez MD    Indications and Patient Condition  Indications for airway management: airway protection and CNS depression    Preoxygenated: yes  MILS maintained throughout  Mask difficulty assessment: 1 - vent by mask    Final Airway Details  Final airway type: endotracheal airway      Successful airway: ETT  Cuffed: yes   Successful intubation technique: direct laryngoscopy  Endotracheal tube insertion site: oral  Blade: Josiah  Blade size: 3  ETT size (mm): 7.5  Cormack-Lehane Classification: grade IIa - partial view of glottis  Placement verified by: chest auscultation, capnometry and palpation of cuff   Cuff volume (mL): 8  Measured from: teeth  ETT/EBT  to teeth (cm): 20  Number of attempts at approach: 1  Assessment: lips, teeth, and gum same as pre-op and atraumatic intubation

## 2021-04-28 NOTE — ANESTHESIA PREPROCEDURE EVALUATION
Anesthesia Evaluation     Patient summary reviewed and Nursing notes reviewed   no history of anesthetic complications:  NPO Solid Status: > 8 hours  NPO Liquid Status: > 2 hours           Airway   Mallampati: II  TM distance: >3 FB  Neck ROM: full  No difficulty expected  Dental - normal exam     Pulmonary - normal exam   (+) a smoker Former Abstained day of surgery, COPD mild,   Cardiovascular - normal exam    ECG reviewed    (+) hyperlipidemia,       Neuro/Psych  (+) psychiatric history Anxiety and Depression,     GI/Hepatic/Renal/Endo    (+) morbid obesity, GERD poorly controlled, PUD,  renal disease, thyroid problem hypothyroidism    Musculoskeletal     (+) arthralgias,   Abdominal   (+) obese,    Substance History      OB/GYN negative ob/gyn ROS         Other   arthritis,    history of cancer remission                    Anesthesia Plan    ASA 3     general   total IV anesthesia  intravenous induction     Anesthetic plan, all risks, benefits, and alternatives have been provided, discussed and informed consent has been obtained with: patient.  Use of blood products discussed with patient  Consented to blood products.

## 2021-04-28 NOTE — ANESTHESIA POSTPROCEDURE EVALUATION
Patient: Luz Bruce    Procedure Summary     Date: 04/28/21 Room / Location: [facility] OR 08 / [facility] MAIN OR    Anesthesia Start: 0835 Anesthesia Stop: 0938    Procedure: Transoral Incisionless Fundoplication without hiatal hernia repair (N/A Abdomen) Diagnosis:       GERD without esophagitis      (GERD without esophagitis [K21.9])    Surgeons: Mirza Dash DO Provider: Johan Gomez MD    Anesthesia Type: general ASA Status: 3          Anesthesia Type: general    Vitals  Vitals Value Taken Time   BP 99/55 04/28/21 1044   Temp 97.3 °F (36.3 °C) 04/28/21 0933   Pulse 76 04/28/21 1049   Resp 12 04/28/21 1033   SpO2 90 % 04/28/21 1049   Vitals shown include unvalidated device data.        Post Anesthesia Care and Evaluation    Patient location during evaluation: PACU  Patient participation: complete - patient participated  Level of consciousness: awake  Pain score: 0  Pain management: adequate  Airway patency: patent  Anesthetic complications: No anesthetic complications  PONV Status: none  Cardiovascular status: acceptable  Respiratory status: acceptable  Hydration status: acceptable    Comments: Patient seen and examined postoperatively; vital signs stable; SpO2 greater than or equal to 90%; cardiopulmonary status stable; nausea/vomiting adequately controlled; pain adequately controlled; no apparent anesthesia complications; patient discharged from anesthesia care when discharge criteria were met

## 2021-04-29 LAB
BH BB BLOOD EXPIRATION DATE: NORMAL
BH BB BLOOD EXPIRATION DATE: NORMAL
BH BB BLOOD TYPE BARCODE: 9500
BH BB BLOOD TYPE BARCODE: 9500
BH BB DISPENSE STATUS: NORMAL
BH BB DISPENSE STATUS: NORMAL
BH BB PRODUCT CODE: NORMAL
BH BB PRODUCT CODE: NORMAL
BH BB UNIT NUMBER: NORMAL
BH BB UNIT NUMBER: NORMAL
CROSSMATCH INTERPRETATION: NORMAL
CROSSMATCH INTERPRETATION: NORMAL
UNIT  ABO: NORMAL
UNIT  ABO: NORMAL
UNIT  RH: NORMAL
UNIT  RH: NORMAL

## 2021-05-18 ENCOUNTER — OFFICE VISIT (OUTPATIENT)
Dept: SURGERY | Facility: CLINIC | Age: 66
End: 2021-05-18

## 2021-05-18 VITALS
BODY MASS INDEX: 43.15 KG/M2 | TEMPERATURE: 97.3 F | DIASTOLIC BLOOD PRESSURE: 84 MMHG | WEIGHT: 259 LBS | OXYGEN SATURATION: 94 % | HEART RATE: 83 BPM | HEIGHT: 65 IN | SYSTOLIC BLOOD PRESSURE: 135 MMHG

## 2021-05-18 DIAGNOSIS — K21.9 GASTROESOPHAGEAL REFLUX DISEASE, UNSPECIFIED WHETHER ESOPHAGITIS PRESENT: Primary | ICD-10-CM

## 2021-05-18 PROCEDURE — 99024 POSTOP FOLLOW-UP VISIT: CPT | Performed by: SURGERY

## 2021-05-18 NOTE — PROGRESS NOTES
SUBJECTIVE:    Ms. Bruce seen in the office today follow-up from her straight TIF 2 weeks ago.  She is doing well.  No nausea or vomiting.  Tolerating her liquids well no spasms.    OBJECTIVE:    No incisions    ASSESSMENT:    Satisfactory postop progress    PLAN:    We have discussed increasing her diet to soft diet and we have given her recommendations for that.  She is still not the eat steak chops fresh salad or bread.  Wanted to go to every other day on her proton pump inhibitor.  Recheck in the office in 2 weeks

## 2021-06-08 ENCOUNTER — OFFICE VISIT (OUTPATIENT)
Dept: SURGERY | Facility: CLINIC | Age: 66
End: 2021-06-08

## 2021-06-08 VITALS
OXYGEN SATURATION: 96 % | BODY MASS INDEX: 42.49 KG/M2 | HEART RATE: 78 BPM | TEMPERATURE: 97.8 F | WEIGHT: 255 LBS | SYSTOLIC BLOOD PRESSURE: 138 MMHG | DIASTOLIC BLOOD PRESSURE: 88 MMHG | HEIGHT: 65 IN

## 2021-06-08 DIAGNOSIS — K21.9 GASTROESOPHAGEAL REFLUX DISEASE, UNSPECIFIED WHETHER ESOPHAGITIS PRESENT: Primary | ICD-10-CM

## 2021-06-08 PROCEDURE — 99024 POSTOP FOLLOW-UP VISIT: CPT | Performed by: SURGERY

## 2021-06-08 NOTE — PROGRESS NOTES
SUBJECTIVE:    Ms. Bruce seen in the office today follow-up from her straight TIF done about 5 weeks ago.  He is doing well.  Tolerating her soft diet well.    OBJECTIVE:    No incisions    ASSESSMENT:    Satisfactory postop progress    PLAN:    At this point she can stop taking her proton pump inhibitor.  She is to stay on the soft diet another 2 weeks.  Like to recheck her in the office in 2 weeks.  If she is doing well at that point we will increase her back to regular foods and check her for another 6 months.

## 2021-06-22 ENCOUNTER — OFFICE VISIT (OUTPATIENT)
Dept: SURGERY | Facility: CLINIC | Age: 66
End: 2021-06-22

## 2021-06-22 VITALS
BODY MASS INDEX: 42.65 KG/M2 | WEIGHT: 256 LBS | DIASTOLIC BLOOD PRESSURE: 88 MMHG | HEART RATE: 89 BPM | TEMPERATURE: 98.2 F | HEIGHT: 65 IN | SYSTOLIC BLOOD PRESSURE: 156 MMHG | OXYGEN SATURATION: 94 %

## 2021-06-22 DIAGNOSIS — K21.9 GASTROESOPHAGEAL REFLUX DISEASE, UNSPECIFIED WHETHER ESOPHAGITIS PRESENT: Primary | ICD-10-CM

## 2021-06-22 PROCEDURE — 99024 POSTOP FOLLOW-UP VISIT: CPT | Performed by: SURGERY

## 2021-06-22 NOTE — PROGRESS NOTES
SUBJECTIVE:    Ms. Bruce seen in the office today follow-up from her straight TIF done on 4/28.  She is doing well    OBJECTIVE:    Tolerating her soft foods well.  No nausea or vomiting.  No heartburn    ASSESSMENT:    Satisfactory postop progress    PLAN:    This point she can resume regular foods.  Recheck in our office in 6 months

## 2022-01-26 ENCOUNTER — OFFICE VISIT (OUTPATIENT)
Dept: SURGERY | Facility: CLINIC | Age: 67
End: 2022-01-26

## 2022-01-26 VITALS
HEIGHT: 65 IN | BODY MASS INDEX: 43.49 KG/M2 | WEIGHT: 261 LBS | TEMPERATURE: 96.6 F | SYSTOLIC BLOOD PRESSURE: 142 MMHG | HEART RATE: 68 BPM | DIASTOLIC BLOOD PRESSURE: 90 MMHG | OXYGEN SATURATION: 94 %

## 2022-01-26 DIAGNOSIS — K21.9 GASTROESOPHAGEAL REFLUX DISEASE WITHOUT ESOPHAGITIS: Primary | ICD-10-CM

## 2022-01-26 PROCEDURE — 99213 OFFICE O/P EST LOW 20 MIN: CPT | Performed by: SURGERY

## 2022-01-26 NOTE — PROGRESS NOTES
Subjective   Luz Bruce is a 66 y.o. female.     History of present illness  This gilmer is seen in the office today follow-up from her straight TIF done last April.  She is doing very well.  No fevers chills no nausea vomiting no heartburn eating well    Past Medical History:   Diagnosis Date   • Allergies    • Anxiety and depression    • Arthritis    • Blurred vision    • Cancer (HCC)     left kidney   • COPD (chronic obstructive pulmonary disease) (HCC)     mild   • GERD (gastroesophageal reflux disease)    • Hyperlipidemia    • Hypothyroid    • Neuropathy    • OAB (overactive bladder)    • Osteopenia    • Rash     face       Past Surgical History:   Procedure Laterality Date   • BUNIONECTOMY     • ENDOSCOPY N/A 3/22/2021    Procedure: ESOPHAGOGASTRODUODENOSCOPY with dilatation, bougie 48, 50, 52, 54, 56, 58, AND BIOPSY;  Surgeon: Mirza Dash DO;  Location: [facility] ENDOSCOPY;  Service: General;  Laterality: N/A;  gastritis, GERD, dysphagia   • FOOT SURGERY      5 nodes removed   • HIATAL HERNIA REPAIR N/A 4/28/2021    Procedure: Transoral Incisionless Fundoplication without hiatal hernia repair;  Surgeon: Mirza Dash DO;  Location: [facility] MAIN OR;  Service: General;  Laterality: N/A;   • HYSTERECTOMY     • NEPHRECTOMY RADICAL Left 2016    cancer   • PLANTAR FASCIECTOMY         Outpatient Encounter Medications as of 1/26/2022   Medication Sig Dispense Refill   • alendronate (FOSAMAX) 70 MG tablet Take 70 mg by mouth Every 7 (Seven) Days. sunday     • atorvastatin (LIPITOR) 20 MG tablet Take 20 mg by mouth Every Night.     • buPROPion XL (WELLBUTRIN XL) 150 MG 24 hr tablet Take 150 mg by mouth Every Morning. Take preop     • escitalopram (LEXAPRO) 20 MG tablet Take 20 mg by mouth Daily. Take preop     • gabapentin (NEURONTIN) 600 MG tablet Take 600 mg by mouth 3 (Three) Times a Day. Take preop     • ketoconazole (NIZORAL) 2 % cream Apply 1 application topically to the appropriate area as directed  Daily. Use post procedure     • levocetirizine (XYZAL) 5 MG tablet Take 5 mg by mouth Daily. May take preop     • levothyroxine (SYNTHROID, LEVOTHROID) 200 MCG tablet Take 200 mcg by mouth Daily. Take preop     • omeprazole (priLOSEC) 40 MG capsule Take 40 mg by mouth Every Night.     • solifenacin (VESICARE) 10 MG tablet Take 10 mg by mouth Daily. Take preop     • Stiolto Respimat 2.5-2.5 MCG/ACT aerosol solution inhaler Inhale 2 puffs Daily. Take preop     • [DISCONTINUED] ARIPiprazole (ABILIFY) 5 MG tablet Take 5 mg by mouth Daily. Take preop     • [DISCONTINUED] HYDROcodone-acetaminophen (Norco) 5-325 MG per tablet Take 1 tablet by mouth Every 6 (Six) Hours As Needed for Moderate Pain . 20 tablet 0     No facility-administered encounter medications on file as of 2022.       Allergies   Allergen Reactions   • Amoxicillin Rash       Family History   Problem Relation Age of Onset   • Lung cancer Mother    • Hyperlipidemia Mother    • Cervical cancer Sister    • Lung cancer Sister        Social History     Socioeconomic History   • Marital status:    Tobacco Use   • Smoking status: Former Smoker     Types: Cigarettes     Quit date: 2007     Years since quittin.9   • Smokeless tobacco: Never Used   Vaping Use   • Vaping Use: Never used   Substance and Sexual Activity   • Alcohol use: Yes     Comment: occ.   • Drug use: Never   • Sexual activity: Defer       The following portions of the patient's history were reviewed and updated as appropriate: allergies, current medications, past family history, past medical history, past social history, past surgical history and problem list.    Objective   Complete review of systems is done and unremarkable exception the chief complaint    Physical exam shows pleasant 66-year-old female.  HEENT is negative.  Heart regular.  Lungs are clear.  Abdomen is soft and nontender without mass.  Extremities are equal range of motion and usage.  Neuro with no obvious  focal deficit.    Impression: She is status post a straight TIF in April last year    Recommendation: I suggested that she be scoped in 18 months.  We will ask her to see Dr. Sanford as I will be retired at that point    Assessment/Plan   There are no diagnoses linked to this encounter.               Mirza Dash, DO  1/26/2022  14:10 EST

## (undated) DEVICE — SINGLE-USE BIOPSY FORCEPS: Brand: RADIAL JAW 4

## (undated) DEVICE — PK ENDO GI 50

## (undated) DEVICE — SOL IRRIG H2O 1000ML STRL

## (undated) DEVICE — BITEBLOCK ENDO W/STRAP 60F A/ LF DISP

## (undated) DEVICE — KT SURG TURNOVER 050

## (undated) DEVICE — GLV SURG BIOGEL SENSR LTX PF SZ7.5